# Patient Record
Sex: MALE | URBAN - METROPOLITAN AREA
[De-identification: names, ages, dates, MRNs, and addresses within clinical notes are randomized per-mention and may not be internally consistent; named-entity substitution may affect disease eponyms.]

---

## 2021-01-05 ENCOUNTER — APPOINTMENT (RX ONLY)
Dept: URBAN - METROPOLITAN AREA CLINIC 77 | Facility: CLINIC | Age: 46
Setting detail: DERMATOLOGY
End: 2021-01-05

## 2021-01-05 DIAGNOSIS — L30.0 NUMMULAR DERMATITIS: ICD-10-CM

## 2021-01-05 DIAGNOSIS — L72.8 OTHER FOLLICULAR CYSTS OF THE SKIN AND SUBCUTANEOUS TISSUE: ICD-10-CM

## 2021-01-05 PROCEDURE — ? COUNSELING

## 2021-01-05 PROCEDURE — ? DIAGNOSIS COMMENT

## 2021-01-05 PROCEDURE — ? PRESCRIPTION

## 2021-01-05 PROCEDURE — ? TREATMENT REGIMEN

## 2021-01-05 PROCEDURE — 99203 OFFICE O/P NEW LOW 30 MIN: CPT

## 2021-01-05 RX ORDER — HALOBETASOL PROPIONATE 0.5 MG/G
AEROSOL, FOAM TOPICAL
Qty: 1 | Refills: 2 | Status: ERX | COMMUNITY
Start: 2021-01-05

## 2021-01-05 RX ADMIN — HALOBETASOL PROPIONATE: 0.5 AEROSOL, FOAM TOPICAL at 00:00

## 2021-01-05 ASSESSMENT — LOCATION DETAILED DESCRIPTION DERM: LOCATION DETAILED: LEFT SUPERIOR FOREHEAD

## 2021-01-05 ASSESSMENT — LOCATION ZONE DERM: LOCATION ZONE: FACE

## 2021-01-05 ASSESSMENT — LOCATION SIMPLE DESCRIPTION DERM: LOCATION SIMPLE: LEFT FOREHEAD

## 2021-01-05 NOTE — PROCEDURE: TREATMENT REGIMEN
Plan: Location: left frontal scalp \\nPlan: Excision \\n\\nPatient presents today with a lump on scalp that he states has been there for 3-4 years, he states he does find this to be very bothersome and would like to have it removed.\\nDiscussed with the patient surgery will leave a scar, however it is in my opinion scar will be minor and if treatment is needed to make scar look more appealing this is something I can do here in office.\\nWill excise in the future if patient finds bothersome \\n\\n\\nPatient is to follow up as needed
Detail Level: Zone
Plan: Location: left lower leg\\nPrescribed: Lexette topical foam ( Apply to affected areas daily or as needed. Avoiding the face)\\nPharmacy: [facility] \\n\\n\\nPatient presents today with irritation on lower right leg that he states occurs off and on for the past couple of years.\\nDiscussed with the patient Nummular eczema, also known as nummular dermatitis or discoid eczema, is a chronic condition that causes coin-shaped spots to develop on the skin. These spots are often itchy and well-defined. They may ooze clear fluid or become dry and crusty.\\nNummular eczema often appears after a skin injury, such as a burn, abrasion, or insect bite. The condition may result in one patch or multiple patches of coin-shaped lesions. The patches can last for several months.\\nNummular eczema tends to occur more often in men than in women. Men usually have their first episode between ages 55 and 65. Women typically get it during adolescence or young adulthood.\\nThough the symptoms of nummular eczema can be bothersome, they can be treated with antihistamines and topical medicines. It isn’t contagious, which means it can’t be passed from one person to another through direct skin contact.\\n\\nPLAN:\\n\\n1. PATIENT IS TO APPLY LEXETTE TOPICAL FOAM TO THE AFFECTED AREAS DAILY AVOIDING THE FACE\\n2. HE WAS INSTRUCTED TO APPLY GOOD A MOISTURIZER OVER \\n3. HE WAS INSTRUCTED TO DISCONTINUE SOAP ON ENTIRE BODY AND ONLY CLEANSE THE PITS AND THE PRIVATES \\n\\nHe is to continue current regimen and follow up as needed